# Patient Record
Sex: FEMALE | Race: BLACK OR AFRICAN AMERICAN | Employment: OTHER | URBAN - METROPOLITAN AREA
[De-identification: names, ages, dates, MRNs, and addresses within clinical notes are randomized per-mention and may not be internally consistent; named-entity substitution may affect disease eponyms.]

---

## 2018-03-26 PROBLEM — J06.9 VIRAL URI: Status: ACTIVE | Noted: 2018-03-26

## 2018-05-17 PROBLEM — J06.9 VIRAL URI: Status: RESOLVED | Noted: 2018-03-26 | Resolved: 2018-05-17

## 2018-05-17 PROBLEM — M85.89 OSTEOPENIA OF MULTIPLE SITES: Status: ACTIVE | Noted: 2018-05-17

## 2018-07-24 PROBLEM — M25.551 RIGHT HIP PAIN: Status: ACTIVE | Noted: 2018-07-24

## 2018-07-24 PROBLEM — M51.36 LUMBAR DEGENERATIVE DISC DISEASE: Status: ACTIVE | Noted: 2018-07-24

## 2018-07-24 PROBLEM — M25.552 LEFT HIP PAIN: Status: ACTIVE | Noted: 2018-07-24

## 2019-07-15 ENCOUNTER — HOSPITAL ENCOUNTER (INPATIENT)
Facility: HOSPITAL | Age: 69
LOS: 2 days | Discharge: HOME | DRG: 392 | End: 2019-07-17
Attending: INTERNAL MEDICINE | Admitting: INTERNAL MEDICINE
Payer: MEDICARE

## 2019-07-15 PROBLEM — K57.92 DIVERTICULITIS: Status: ACTIVE | Noted: 2019-07-15

## 2019-07-15 PROCEDURE — S0077 INJECTION, CLINDAMYCIN PHOSP: HCPCS | Performed by: INTERNAL MEDICINE

## 2019-07-15 RX ORDER — CLINDAMYCIN PHOSPHATE 600 MG/50ML
600 INJECTION INTRAVENOUS EVERY 8 HOURS
Status: DISCONTINUED | OUTPATIENT
Start: 2019-07-15 | End: 2019-07-16

## 2019-07-15 RX ORDER — METOCLOPRAMIDE HYDROCHLORIDE 5 MG/ML
10 INJECTION INTRAMUSCULAR; INTRAVENOUS EVERY 8 HOURS PRN
Status: DISCONTINUED | OUTPATIENT
Start: 2019-07-15 | End: 2019-07-17

## 2019-07-15 RX ORDER — MORPHINE SULFATE 4 MG/ML
4 INJECTION, SOLUTION INTRAMUSCULAR; INTRAVENOUS EVERY 2 HOUR PRN
Status: DISCONTINUED | OUTPATIENT
Start: 2019-07-15 | End: 2019-07-17

## 2019-07-15 RX ORDER — ACETAMINOPHEN 325 MG/1
650 TABLET ORAL EVERY 6 HOURS PRN
Status: DISCONTINUED | OUTPATIENT
Start: 2019-07-15 | End: 2019-07-17

## 2019-07-15 RX ORDER — ONDANSETRON 2 MG/ML
4 INJECTION INTRAMUSCULAR; INTRAVENOUS EVERY 6 HOURS PRN
Status: DISCONTINUED | OUTPATIENT
Start: 2019-07-15 | End: 2019-07-17

## 2019-07-15 RX ORDER — SODIUM CHLORIDE 9 MG/ML
INJECTION, SOLUTION INTRAVENOUS CONTINUOUS
Status: DISCONTINUED | OUTPATIENT
Start: 2019-07-15 | End: 2019-07-17

## 2019-07-15 RX ORDER — MORPHINE SULFATE 4 MG/ML
2 INJECTION, SOLUTION INTRAMUSCULAR; INTRAVENOUS EVERY 2 HOUR PRN
Status: DISCONTINUED | OUTPATIENT
Start: 2019-07-15 | End: 2019-07-17

## 2019-07-15 RX ORDER — MORPHINE SULFATE 4 MG/ML
1 INJECTION, SOLUTION INTRAMUSCULAR; INTRAVENOUS EVERY 2 HOUR PRN
Status: DISCONTINUED | OUTPATIENT
Start: 2019-07-15 | End: 2019-07-17

## 2019-07-16 LAB
ANION GAP SERPL CALC-SCNC: 8 MMOL/L (ref 0–18)
BASOPHILS # BLD AUTO: 0.02 X10(3) UL (ref 0–0.2)
BASOPHILS NFR BLD AUTO: 0.5 %
BUN BLD-MCNC: 11 MG/DL (ref 7–18)
BUN/CREAT SERPL: 19.3 (ref 10–20)
CALCIUM BLD-MCNC: 8.2 MG/DL (ref 8.5–10.1)
CHLORIDE SERPL-SCNC: 111 MMOL/L (ref 98–112)
CO2 SERPL-SCNC: 25 MMOL/L (ref 21–32)
CREAT BLD-MCNC: 0.57 MG/DL (ref 0.55–1.02)
DEPRECATED RDW RBC AUTO: 42.9 FL (ref 35.1–46.3)
EOSINOPHIL # BLD AUTO: 0.08 X10(3) UL (ref 0–0.7)
EOSINOPHIL NFR BLD AUTO: 2.1 %
ERYTHROCYTE [DISTWIDTH] IN BLOOD BY AUTOMATED COUNT: 13.7 % (ref 11–15)
GLUCOSE BLD-MCNC: 74 MG/DL (ref 70–99)
HCT VFR BLD AUTO: 30.5 % (ref 35–48)
HGB BLD-MCNC: 9.9 G/DL (ref 12–16)
IMM GRANULOCYTES # BLD AUTO: 0.02 X10(3) UL (ref 0–1)
IMM GRANULOCYTES NFR BLD: 0.5 %
INR BLD: 1.17 (ref 0.9–1.1)
LYMPHOCYTES # BLD AUTO: 1.47 X10(3) UL (ref 1–4)
LYMPHOCYTES NFR BLD AUTO: 37.7 %
MCH RBC QN AUTO: 28.1 PG (ref 26–34)
MCHC RBC AUTO-ENTMCNC: 32.5 G/DL (ref 31–37)
MCV RBC AUTO: 86.6 FL (ref 80–100)
MONOCYTES # BLD AUTO: 0.58 X10(3) UL (ref 0.1–1)
MONOCYTES NFR BLD AUTO: 14.9 %
NEUTROPHILS # BLD AUTO: 1.73 X10 (3) UL (ref 1.5–7.7)
NEUTROPHILS # BLD AUTO: 1.73 X10(3) UL (ref 1.5–7.7)
NEUTROPHILS NFR BLD AUTO: 44.3 %
OSMOLALITY SERPL CALC.SUM OF ELEC: 296 MOSM/KG (ref 275–295)
PLATELET # BLD AUTO: 260 10(3)UL (ref 150–450)
POTASSIUM SERPL-SCNC: 3.4 MMOL/L (ref 3.5–5.1)
PSA SERPL DL<=0.01 NG/ML-MCNC: 15.4 SECONDS (ref 12.5–14.7)
RBC # BLD AUTO: 3.52 X10(6)UL (ref 3.8–5.3)
SODIUM SERPL-SCNC: 144 MMOL/L (ref 136–145)
WBC # BLD AUTO: 3.9 X10(3) UL (ref 4–11)

## 2019-07-16 PROCEDURE — S0077 INJECTION, CLINDAMYCIN PHOSP: HCPCS | Performed by: INTERNAL MEDICINE

## 2019-07-16 PROCEDURE — 80048 BASIC METABOLIC PNL TOTAL CA: CPT | Performed by: INTERNAL MEDICINE

## 2019-07-16 PROCEDURE — 85610 PROTHROMBIN TIME: CPT | Performed by: INTERNAL MEDICINE

## 2019-07-16 PROCEDURE — 94640 AIRWAY INHALATION TREATMENT: CPT

## 2019-07-16 PROCEDURE — 85025 COMPLETE CBC W/AUTO DIFF WBC: CPT | Performed by: INTERNAL MEDICINE

## 2019-07-16 RX ORDER — ATORVASTATIN CALCIUM 10 MG/1
10 TABLET, FILM COATED ORAL NIGHTLY
Status: DISCONTINUED | OUTPATIENT
Start: 2019-07-16 | End: 2019-07-17

## 2019-07-16 RX ORDER — ALBUTEROL SULFATE 90 UG/1
1-2 AEROSOL, METERED RESPIRATORY (INHALATION) EVERY 6 HOURS PRN
Status: DISCONTINUED | OUTPATIENT
Start: 2019-07-16 | End: 2019-07-17

## 2019-07-16 RX ORDER — ENOXAPARIN SODIUM 100 MG/ML
40 INJECTION SUBCUTANEOUS DAILY
Status: DISCONTINUED | OUTPATIENT
Start: 2019-07-16 | End: 2019-07-17

## 2019-07-16 NOTE — CONSULTS
[facility]  Report of Consultation    Olvin Rosales Patient Status:  Inpatient    8/3/1950 MRN [id number]   [facility] 3NW-A Attending Shashank Oreilly MD   [facility] Day # 1 PCP Catherine Parra MD     23    Reason for Consultatio WNL--> next 2026   • COLONOSCOPY     • COLONOSCOPY N/A 6/12/2014    Performed by Demetrice Hernandez MD at [address] [facility]     • OTHER SURGICAL HISTORY      thyroidectomy--partial thyroidectomy 1990, rest removed 2008   • THYROIDECTOMY (500 mg total) by mouth 2 (two) times daily for 10 days. Disp: 20 tablet Rfl: 0   metRONIDAZOLE (FLAGYL) 500 MG Oral Tab Take 1 tablet (500 mg total) by mouth 3 (three) times daily for 10 days.  Disp: 30 tablet Rfl: 0   Levothyroxine Sodium 137 MCG Oral Tab breathing    CARDIOVASCULAR: RRR    ABDOMEN: soft, fullness noted in LLQ, mild tenderness LLQ, no peritonitis     LYMPHATIC: no lymphadenopathy    EXTREMITIES: no cyanosis, clubbing or edema    .     Laboratory Data:  Recent Labs   Lab 07/15/19  1544 07/16/ Abdominal aorta is normal in caliber. LYMPH NODES: No new lymphadenopathy is seen in the abdomen and pelvis. BOWEL: There are diverticula in colon.  The previously noted thick-walled outpouching along the left lateral wall of the sigmoid colon is reidentifi answered      KAMLA Gómez [address] [place] [address]  General Surgery  7/16/2019

## 2019-07-16 NOTE — PROGRESS NOTES
PT ADMITTED. PT RESTING IN BED, EASY NON LABORED WITH DIFFICULTY. IV FLUIDS STARTED. DTV. PLAN OF CARE DISCUSSED. ALL QUESTIONS ANSWERED. INSTRUCTED TO CALL IF ANY NEEDS ARISE.

## 2019-07-16 NOTE — PLAN OF CARE
Problem: Patient/Family Goals  Goal: Patient/Family Long Term Goal  Description  Patient's Long Term Goal:   - Feel better and go home  Interventions:  - Plan of care followed, Surgery to see.  IV abx, IVF, NPO  - See additional Care Plan goals for specif for assistance with activity based on assessment  - Modify environment to reduce risk of injury  - Provide assistive devices as appropriate  - Consider OT/PT consult to assist with strengthening/mobility  - Encourage toileting schedule  Outcome: Progressin

## 2019-07-16 NOTE — PLAN OF CARE
Problem: RISK FOR INFECTION - ADULT  Goal: Absence of fever/infection during anticipated neutropenic period  Description  INTERVENTIONS  - Monitor WBC  - Administer growth factors as ordered  - Implement neutropenic guidelines  Outcome: Progressing

## 2019-07-16 NOTE — H&P
[facility] Hospitalist History and Physical      CC:  abd pain     PCP: Nabila Ace MD      History of Present Illness: Patient is a 76year old female with PMH sig for HL, hypothyroidism, presented to [facility] yesterday for re-eval of abdominal pain.   Diagnosed w payton arm)   Pulse 66   Temp 97.8 °F (36.6 °C) (Oral)   Resp 18   SpO2 100%   General:  Alert, no distress, appears stated age. Head:  Normocephalic, without obvious abnormality, atraumatic. Eyes:  Sclera anicteric, No conjunctival pallor, EOMs intact.     Jany Mancia ISAEL manual techniques for patient specific dose reduction were followed while maintaining the necessary diagnostic image quality. ADVERSE REACTION: None. FINDINGS: LUNG BASES: There is dependent atelectasis in the lung bases.  LIVER: Multiple hepatic cyst recommended 2. No change in hepatic cysts and renal cysts 3. Calcified uterine fibroids unchanged 4.  Cholelithiasis       Assessment/Plan:     Acute complicated diverticulitis w possible contained abscess vs enlarged diverticulum  - cont IV  Abx, CLD, IVF

## 2019-07-17 VITALS
BODY MASS INDEX: 25 KG/M2 | WEIGHT: 164.88 LBS | TEMPERATURE: 98 F | SYSTOLIC BLOOD PRESSURE: 118 MMHG | DIASTOLIC BLOOD PRESSURE: 61 MMHG | OXYGEN SATURATION: 98 % | RESPIRATION RATE: 18 BRPM | HEART RATE: 66 BPM

## 2019-07-17 PROBLEM — M51.36 LUMBAR DEGENERATIVE DISC DISEASE: Status: RESOLVED | Noted: 2018-07-24 | Resolved: 2019-07-17

## 2019-07-17 PROBLEM — K57.92 DIVERTICULITIS: Status: RESOLVED | Noted: 2019-07-15 | Resolved: 2019-07-17

## 2019-07-17 PROBLEM — M25.551 RIGHT HIP PAIN: Status: RESOLVED | Noted: 2018-07-24 | Resolved: 2019-07-17

## 2019-07-17 PROBLEM — M25.552 LEFT HIP PAIN: Status: RESOLVED | Noted: 2018-07-24 | Resolved: 2019-07-17

## 2019-07-17 LAB
DEPRECATED RDW RBC AUTO: 44.4 FL (ref 35.1–46.3)
ERYTHROCYTE [DISTWIDTH] IN BLOOD BY AUTOMATED COUNT: 13.9 % (ref 11–15)
HCT VFR BLD AUTO: 32.9 % (ref 35–48)
HGB BLD-MCNC: 10.8 G/DL (ref 12–16)
INR BLD: 1.11 (ref 0.9–1.1)
MCH RBC QN AUTO: 28.5 PG (ref 26–34)
MCHC RBC AUTO-ENTMCNC: 32.8 G/DL (ref 31–37)
MCV RBC AUTO: 86.8 FL (ref 80–100)
PLATELET # BLD AUTO: 294 10(3)UL (ref 150–450)
POTASSIUM SERPL-SCNC: 3.4 MMOL/L (ref 3.5–5.1)
POTASSIUM SERPL-SCNC: 3.8 MMOL/L (ref 3.5–5.1)
PSA SERPL DL<=0.01 NG/ML-MCNC: 14.8 SECONDS (ref 12.5–14.7)
RBC # BLD AUTO: 3.79 X10(6)UL (ref 3.8–5.3)
WBC # BLD AUTO: 4.2 X10(3) UL (ref 4–11)

## 2019-07-17 PROCEDURE — 85610 PROTHROMBIN TIME: CPT | Performed by: INTERNAL MEDICINE

## 2019-07-17 PROCEDURE — 85027 COMPLETE CBC AUTOMATED: CPT | Performed by: PHYSICIAN ASSISTANT

## 2019-07-17 PROCEDURE — 84132 ASSAY OF SERUM POTASSIUM: CPT | Performed by: INTERNAL MEDICINE

## 2019-07-17 RX ORDER — METRONIDAZOLE 500 MG/1
500 TABLET ORAL 3 TIMES DAILY
Qty: 30 TABLET | Refills: 0 | Status: SHIPPED | OUTPATIENT
Start: 2019-07-17 | End: 2019-07-27

## 2019-07-17 RX ORDER — CIPROFLOXACIN 500 MG/1
500 TABLET, FILM COATED ORAL 2 TIMES DAILY
Qty: 20 TABLET | Refills: 0 | Status: SHIPPED | OUTPATIENT
Start: 2019-07-17 | End: 2019-07-27

## 2019-07-17 RX ORDER — POTASSIUM CHLORIDE 20 MEQ/1
40 TABLET, EXTENDED RELEASE ORAL EVERY 4 HOURS
Status: DISPENSED | OUTPATIENT
Start: 2019-07-17 | End: 2019-07-17

## 2019-07-17 NOTE — PROGRESS NOTES
[facility]  Progress Note    Emeryconchita Ruelasisaias Patient Status:  Inpatient    8/3/1950 MRN [id number]   [facility] 3NW-A Attending Annette Samayoa, [facility] Day # 2 PCP Justin Sanches MD     Subjective:    Patient sitting up in chair, to

## 2019-07-17 NOTE — PROGRESS NOTES
[facility] Hospitalist Progress Note                                                                   [address]  8/3/1950    SUBJECTIVE: pt doing better. Pain much improved. Catrachoies n/v.  Randal diverticulitis w possible contained abscess vs enlarged diverticulum  - cont IV  Abx, ivf  - surgery eval, rec conservative management for now  - will dc clinda/ceftriaxone, cont IV meropenem  - pending course consider ID consult      hypokalemia  - replet

## 2019-07-17 NOTE — PLAN OF CARE
Pt tolerated CL breakfast without nausea or increase in pain. Pt rating pain to left lower abd as tender and declines any pain medication. Pt states is hoping to go home today. Await MD to see.

## 2019-07-18 NOTE — DISCHARGE SUMMARY
General Medicine Discharge Summary     Patient ID:  Katie Muhammad  76year old  8/3/1950    Admit date: 7/15/2019    Discharge date and time: 7/17/2019  8:37 PM     Attending Physician: Layla Duong 10 days. , Normal, Disp-30 tablet, R-0      CONTINUE these medications which have NOT CHANGED    Fluticasone Furoate-Vilanterol (BREO ELLIPTA) 100-25 MCG/INH Inhalation Aerosol Powder, Breath Activated  Inhale 1 puff into the lungs daily. , Sample, Disp-1 ea

## 2019-07-18 NOTE — PLAN OF CARE
Pt given dc instructions and RX for Cipro and Flagyl sent to pt's 24 hour pharmacy. Information on low residue diet also given and pt instructed to stay on low residue diet until directed otherwise by surgeon.  Pt stated understanding of instructions and di

## 2019-07-18 NOTE — PLAN OF CARE
Pt tolerated soft diet without nausea or pain. Dr Bibi Mcwilliams will be paged for dc order and antibiotics.

## 2019-08-06 PROCEDURE — 87086 URINE CULTURE/COLONY COUNT: CPT | Performed by: PHYSICIAN ASSISTANT

## 2019-08-09 ENCOUNTER — APPOINTMENT (OUTPATIENT)
Dept: GENERAL RADIOLOGY | Facility: HOSPITAL | Age: 69
End: 2019-08-09
Attending: NURSE PRACTITIONER
Payer: MEDICARE

## 2019-08-09 ENCOUNTER — HOSPITAL ENCOUNTER (EMERGENCY)
Facility: HOSPITAL | Age: 69
Discharge: HOME | End: 2019-08-09
Attending: EMERGENCY MEDICINE
Payer: MEDICARE

## 2019-08-09 VITALS
WEIGHT: 160 LBS | SYSTOLIC BLOOD PRESSURE: 116 MMHG | RESPIRATION RATE: 12 BRPM | DIASTOLIC BLOOD PRESSURE: 74 MMHG | TEMPERATURE: 98 F | BODY MASS INDEX: 24 KG/M2 | OXYGEN SATURATION: 97 % | HEART RATE: 66 BPM
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
## 2019-08-09 DIAGNOSIS — S39.012A STRAIN OF LUMBAR REGION, INITIAL ENCOUNTER: ICD-10-CM

## 2019-08-09 PROCEDURE — 99283 EMERGENCY DEPT VISIT LOW MDM: CPT

## 2019-08-09 PROCEDURE — 72110 X-RAY EXAM L-2 SPINE 4/>VWS: CPT | Performed by: NURSE PRACTITIONER

## 2019-08-09 RX ORDER — TRAMADOL HYDROCHLORIDE 50 MG/1
TABLET ORAL EVERY 6 HOURS PRN
Qty: 10 TABLET | Refills: 0 | Status: SHIPPED | OUTPATIENT
Start: 2019-08-09 | End: 2019-08-22
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
## 2019-09-02 PROBLEM — M54.50 ACUTE LEFT-SIDED LOW BACK PAIN WITHOUT SCIATICA: Status: ACTIVE | Noted: 2019-09-02
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
## 2019-09-23 ENCOUNTER — APPOINTMENT (OUTPATIENT)
Dept: GENERAL RADIOLOGY | Facility: HOSPITAL | Age: 69
End: 2019-09-23
Attending: EMERGENCY MEDICINE
Payer: MEDICARE

## 2019-09-23 ENCOUNTER — HOSPITAL ENCOUNTER (EMERGENCY)
Facility: HOSPITAL | Age: 69
Discharge: HOME | End: 2019-09-23
Attending: EMERGENCY MEDICINE
Payer: MEDICARE

## 2019-09-23 VITALS
SYSTOLIC BLOOD PRESSURE: 121 MMHG | DIASTOLIC BLOOD PRESSURE: 74 MMHG | TEMPERATURE: 98 F | RESPIRATION RATE: 14 BRPM | OXYGEN SATURATION: 100 % | BODY MASS INDEX: 24.8 KG/M2 | HEART RATE: 59 BPM | HEIGHT: 67 IN | WEIGHT: 158 LBS

## 2019-09-23 DIAGNOSIS — M17.11 ARTHRITIS OF RIGHT KNEE: ICD-10-CM

## 2019-09-23 DIAGNOSIS — S86.911A KNEE STRAIN, RIGHT, INITIAL ENCOUNTER: Primary | ICD-10-CM

## 2019-09-23 PROCEDURE — 73560 X-RAY EXAM OF KNEE 1 OR 2: CPT | Performed by: EMERGENCY MEDICINE

## 2019-09-23 PROCEDURE — 99283 EMERGENCY DEPT VISIT LOW MDM: CPT
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
## 2019-10-09 PROBLEM — M54.50 ACUTE LEFT-SIDED LOW BACK PAIN WITHOUT SCIATICA: Status: RESOLVED | Noted: 2019-09-02 | Resolved: 2019-10-09

## 2020-07-15 PROBLEM — M54.2 NECK PAIN: Status: ACTIVE | Noted: 2020-07-15
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
## 2020-10-27 PROBLEM — M17.11 PRIMARY OSTEOARTHRITIS OF RIGHT KNEE: Status: ACTIVE | Noted: 2020-10-27

## 2020-10-27 PROBLEM — M17.12 PRIMARY OSTEOARTHRITIS OF LEFT KNEE: Status: ACTIVE | Noted: 2020-10-27

## 2020-12-21 PROBLEM — R29.898 HAND WEAKNESS: Status: ACTIVE | Noted: 2020-12-21
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
## 2020-12-29 PROBLEM — M17.11 PRIMARY OSTEOARTHRITIS OF RIGHT KNEE: Status: RESOLVED | Noted: 2020-10-27 | Resolved: 2020-12-29

## 2020-12-29 PROBLEM — M17.12 PRIMARY OSTEOARTHRITIS OF LEFT KNEE: Status: RESOLVED | Noted: 2020-10-27 | Resolved: 2020-12-29

## 2020-12-29 PROBLEM — R29.898 HAND WEAKNESS: Status: RESOLVED | Noted: 2020-12-21 | Resolved: 2020-12-29

## 2020-12-29 PROBLEM — M54.2 NECK PAIN: Status: RESOLVED | Noted: 2020-07-15 | Resolved: 2020-12-29

## (undated) NOTE — ED AVS SNAPSHOT
Tashia Karson   MRN: [id number]    Department:  [facility] Emergency Department   Date of Visit:  8/9/2019           Disclosure     Insurance plans vary and the physician(s) referred by the ER may not be covered by your plan.  Please contact your tell this physician (or your personal doctor if your instructions are to return to your personal doctor) about any new or lasting problems. The primary care or specialist physician will see patients referred from the [facility] Emergency Department.  Corky Jay

## (undated) NOTE — ED AVS SNAPSHOT
Penelope Kilgore   MRN: [id number]    Department:  [facility] Emergency Department   Date of Visit:  9/23/2019           Disclosure     Insurance plans vary and the physician(s) referred by the ER may not be covered by your plan.  Please contact you tell this physician (or your personal doctor if your instructions are to return to your personal doctor) about any new or lasting problems. The primary care or specialist physician will see patients referred from the [facility] Emergency Department.  Rk Hale